# Patient Record
Sex: MALE | URBAN - METROPOLITAN AREA
[De-identification: names, ages, dates, MRNs, and addresses within clinical notes are randomized per-mention and may not be internally consistent; named-entity substitution may affect disease eponyms.]

---

## 2020-09-14 ENCOUNTER — TELEPHONE ENCOUNTER (OUTPATIENT)
Dept: URBAN - METROPOLITAN AREA CLINIC 92 | Facility: CLINIC | Age: 40
End: 2020-09-14

## 2020-09-25 ENCOUNTER — LAB OUTSIDE AN ENCOUNTER (OUTPATIENT)
Dept: URBAN - METROPOLITAN AREA TELEHEALTH 2 | Facility: TELEHEALTH | Age: 40
End: 2020-09-25

## 2020-09-25 ENCOUNTER — OFFICE VISIT (OUTPATIENT)
Dept: URBAN - METROPOLITAN AREA TELEHEALTH 2 | Facility: TELEHEALTH | Age: 40
End: 2020-09-25
Payer: COMMERCIAL

## 2020-09-25 DIAGNOSIS — Z71.89 VACCINE COUNSELING: ICD-10-CM

## 2020-09-25 DIAGNOSIS — Z79.899 HIGH RISK MEDICATION USE: ICD-10-CM

## 2020-09-25 DIAGNOSIS — Z31.69 INFERTILITY COUNSELING: ICD-10-CM

## 2020-09-25 DIAGNOSIS — B18.1 CHRONIC HEPATITIS B: ICD-10-CM

## 2020-09-25 PROCEDURE — G8427 DOCREV CUR MEDS BY ELIG CLIN: HCPCS

## 2020-09-25 PROCEDURE — 1036F TOBACCO NON-USER: CPT

## 2020-09-25 PROCEDURE — G8417 CALC BMI ABV UP PARAM F/U: HCPCS

## 2020-09-25 PROCEDURE — 99203 OFFICE O/P NEW LOW 30 MIN: CPT

## 2020-09-25 PROCEDURE — G9903 PT SCRN TBCO ID AS NON USER: HCPCS

## 2020-09-25 NOTE — HPI-TODAY'S VISIT:
Patient is a 39-year-old male who is been referred by Dr. Te Erickson  for hepatitis b.  Doing fertility evaluation and this is to try in vitro when they do the cycle.  Through the blood work that he has hep b.  Wife says she is negative for hep b and she is immune to hepatitis be and she says that has ab, If she does hep b core and if neg  then was from vaccine. If b core pos had it.  Please see that the patient is from 2020 labs noted to be HIV negative.  Hep B surface antigen positive.  Hep B core IgM negative hep C antibody negative.  RPR negative.  Blood type is B+.  Patient previously was a patient of Dr. Marlon Serna.  Last seen by him back in 2005 relieved by 1 note that we saw.  Patient in  was listed as having chronic hepatitis B and had been on hep B treatment at that time frame about 29 months at a dosage of 10 mg a day.  Due to the fact that the patient had a hep B DNA that was positive at 972,000 they felt that it stopped working so they switched the patient to Baraclude but does use a 0.5 mg a day.  Back in May 2005 the hep B DNA had been 52,200 and Dr. Palencia had plan to start the patient on Baraclude once available due to suspicion of resistance.   We do see an ultrasound done on the patient on 2004 showed a liver span of 15.5 cm with normal-appearing parenchyma.  No gallbladder wall thickening.  No gallstones.  Pancreas was normal normal right kidney of 12.4 x 4.3 x 4.1 cm.  Other laboratories from 2005 showed a glucose of 82 BUN of 12 creatinine 1.0 sodium 143 potassium 4.4 total protein 8.6 albumin 4.7 bilirubin 0.9 AST 20 [id number] up's count 7.2 hemoglobin 15.5 plate count 243 AFP 1.3   he started on baraclude and was on it for a time.  He has not been on treatment since.  Both parents have hep b and never on med. They cleared it.  Father cleared it and mother cleared it.  2020 hpe b 5430.    2020 b sag pos and hcv ab neg. rpr neg and b pos.   They did a genetic screen for glycogen storage disease type 2 (pompe disease).   I don't have liver enzymes.  We look for a dna >2000 and alt  greater than 35 and we don't have that.  He has not hep b care includes even when not on meds to do the u.s every 6m. Not done in while.  They live in [place].   Do u.s and labs and hep b to see where at.  I need to call Dr Erickson re this. In the male pt, sperm works or doesn't. Don't worry about his treatment.   in women the issue much more of an issue to get pregnant and also to decrease the risk to the baby.  In men this has not been the issue that I have that i have seen.  Men with cirrhosis due to hep b or c and if treat them more of a chance to have a child.  Sperm count he says is normal.  Plan: 1. Need to do the labs and the u.s. 2. I will call Dr Erickson later to discuss the case. 3. I think he may have resistance but the new meds do overcome this.  Stressed to pt the need for social distancing and strict handwashing and wearing a mask and to follow any other new or added CDC recommendations as this is an evolving target.  Duration of the visit was 37 min (majority 21 via the [employer]ow and then 16 by phone) with greater than 50% of time spent reviewing treatment and issues. with the pt and his wife.   Addendum: spoke with Dr Erickson. Sent mainly for care of his bep b and not for fertility tx impacted by it and so we advised Dr Erickson of what we were doing and would do labs and u.s and assess for active tx or monitoring. Called pt and spoke with pt re this also.  More than half of the face-to-face time used for counseling and coordination of care.  Patient seen today via telehealth by agreement and consent of patient in light of current COVID-19 pandemic. I used video conferencing during the visit. The patient encounter is appropriate and reasonable under the circumstances given the patient's particular presentation at this time. The patient has been advised of the followin) the potential risks and limitations of this mode of treatment (including but not limited to the absence of in-person examination); 2) the right to refuse telehealth services at any point without affecting the right to future care; 3) the right to receive in-person services, included immediately after this consultation if an urgent need arises; 4) information, including identifiable images or information from this telehealth consult, will only be shared in accordance with HIPAA regulations. Any and all of the patient's and/or patient's family member's questions on this issue have been answered. The patient has verbally consented to be treated via telehealth services. The patient has also been advised to contact this office for worsening conditions or problems, and seek emergency medical treatment and/or call 911 if the patient deems either necessary.

## 2020-10-01 ENCOUNTER — OFFICE VISIT (OUTPATIENT)
Dept: URBAN - METROPOLITAN AREA CLINIC 91 | Facility: CLINIC | Age: 40
End: 2020-10-01
Payer: COMMERCIAL

## 2020-10-01 DIAGNOSIS — B18.1 CHRONIC HEPATITIS B: ICD-10-CM

## 2020-10-01 PROCEDURE — 93975 VASCULAR STUDY: CPT

## 2020-10-01 PROCEDURE — 76705 ECHO EXAM OF ABDOMEN: CPT

## 2020-10-02 ENCOUNTER — TELEPHONE ENCOUNTER (OUTPATIENT)
Dept: URBAN - METROPOLITAN AREA CLINIC 92 | Facility: CLINIC | Age: 40
End: 2020-10-02

## 2020-10-02 NOTE — HPI-TODAY'S VISIT:
Dear Kale, Good news foir you as U.s: overall felt to be a negative right upper quadrant ultrasound. Liver vessels patent. Spleen 10cm.  right kidney 12.4cm and no hydronephrosis. Pancreas normal. No gallstone. Common duct 4.6mm.  Liver normal echogenicity and no lesions.   Needs to be done every 6m.  Dr Gonzalez

## 2020-10-03 ENCOUNTER — TELEPHONE ENCOUNTER (OUTPATIENT)
Dept: URBAN - METROPOLITAN AREA CLINIC 92 | Facility: CLINIC | Age: 40
End: 2020-10-03

## 2020-10-03 LAB
A/G RATIO: 1.8
ALBUMIN: 5
ALKALINE PHOSPHATASE: 58
ALT (SGPT): 31
AST (SGOT): 30
BASO (ABSOLUTE): 0
BASOS: 1
BILIRUBIN, TOTAL: 1
BUN/CREATININE RATIO: 13
BUN: 13
CALCIUM: 9.8
CARBON DIOXIDE, TOTAL: 25
CHLORIDE: 102
CREATININE: 0.97
EGFR IF AFRICN AM: 114
EGFR IF NONAFRICN AM: 99
EOS (ABSOLUTE): 0.1
EOS: 1
GLOBULIN, TOTAL: 2.8
GLUCOSE: 94
HBV IU/ML: 2270
HEMATOCRIT: 44.2
HEMATOLOGY COMMENTS:: (no result)
HEMOGLOBIN: 14.7
HEP A AB, TOTAL: NEGATIVE
HEP BE AB: POSITIVE
HEP BE AG: POSITIVE
IMMATURE CELLS: (no result)
IMMATURE GRANS (ABS): 0
IMMATURE GRANULOCYTES: 0
LOG10 HBV IU/ML: 3.36
LYMPHS (ABSOLUTE): 2.1
LYMPHS: 54
MCH: 31.5
MCHC: 33.3
MCV: 95
MONOCYTES(ABSOLUTE): 0.2
MONOCYTES: 5
NEUTROPHILS (ABSOLUTE): 1.5
NEUTROPHILS: 39
NRBC: (no result)
PLATELETS: 173
POTASSIUM: 4.9
PROTEIN, TOTAL: 7.8
RBC: 4.67
RDW: 12
SODIUM: 142
TEST INFORMATION:: (no result)
WBC: 3.8

## 2020-10-03 NOTE — HPI-TODAY'S VISIT:
Dear Kale Lee The results of your recent tests are explained below: glu 94 and bun 13 and cr 0.97 and na 142 and k 4.9 and cl 102 and ca 9.8 and alb 5.0 and tb 1.0 and alk 58 and ast 30 and alt 31 (ideal alt <35).  wbc 5.8 hg 14.7 plat 173 normal. mcv 95.  Still eag positive. Curiously also eab positive suggesting that you may be transitioning to the lower replication state as usually see one or the other positive and not both unless in that transition. HBV dna 2270 so above 2000 but not with the abnormal ALT needed to say you need treatment by guidelines.  No immunity seen to hepatitis a and consider getting hep a vaccine series as if get that and have the chronic hep b can be sicker than if did not have the chronic hep b.  We will discuss this more at the next visit.   Dr Gonzalez

## 2020-10-07 ENCOUNTER — OFFICE VISIT (OUTPATIENT)
Dept: URBAN - METROPOLITAN AREA TELEHEALTH 2 | Facility: TELEHEALTH | Age: 40
End: 2020-10-07

## 2020-10-23 ENCOUNTER — OFFICE VISIT (OUTPATIENT)
Dept: URBAN - METROPOLITAN AREA TELEHEALTH 2 | Facility: TELEHEALTH | Age: 40
End: 2020-10-23
Payer: COMMERCIAL

## 2020-10-23 DIAGNOSIS — B18.1 CHRONIC HEPATITIS B: ICD-10-CM

## 2020-10-23 DIAGNOSIS — Z31.69 INFERTILITY COUNSELING: ICD-10-CM

## 2020-10-23 DIAGNOSIS — E66.3 OVERWEIGHT: ICD-10-CM

## 2020-10-23 DIAGNOSIS — E74.02 POMPE DISEASE: ICD-10-CM

## 2020-10-23 PROCEDURE — G8417 CALC BMI ABV UP PARAM F/U: HCPCS

## 2020-10-23 PROCEDURE — 1036F TOBACCO NON-USER: CPT

## 2020-10-23 PROCEDURE — G8427 DOCREV CUR MEDS BY ELIG CLIN: HCPCS

## 2020-10-23 PROCEDURE — G9903 PT SCRN TBCO ID AS NON USER: HCPCS

## 2020-10-23 PROCEDURE — 99215 OFFICE O/P EST HI 40 MIN: CPT

## 2020-10-23 PROCEDURE — 99214 OFFICE O/P EST MOD 30 MIN: CPT

## 2020-10-23 PROCEDURE — G8483 FLU IMM NO ADMIN DOC REA: HCPCS

## 2020-10-23 NOTE — HPI-TODAY'S VISIT:
Patient is a 39-year-old male who is been referred by Dr. Te Erickson  for hepatitis b chronic state.  Doing fertility evaluation and this is to try in vitro when they do the cycle.   They say that is to retrieve eggs at end of nov and may want to wait for next dec.   Recent labs:  glu 94 and bun 13 and cr 0.97 and na 142 and k 4.9 and cl 102 and ca 9.8 and alb 5.0 and tb 1.0 and alk 58 and ast 30 and alt 31 (ideal alt <35).  wbc 5.8 hg 14.7 plat 173 normal. mcv 95.  Still eag positive. Curiously also eab positive suggesting that he may be transitioning to the lower replication state as usually see one or the other positive and not both unless in that transition. HBV dna 2270 so above 2000 but not with the abnormal ALT needed to say you need treatment by guidelines.  No immunity seen to hepatitis a and consider getting hep a vaccine series as if get that and have the chronic hep b can be sicker than if did not have the chronic hep b.  we would redo in 6 and 12 weeks.   U.s: overall felt to be a negative right upper quadrant ultrasound. Liver vessels patent. Spleen 10cm.  right kidney 12.4cm and no hydronephrosis. Pancreas normal. No gallstone. Common duct 4.6mm.  Liver normal echogenicity and no lesions.   Stressed should be on healthy diet.  Through the blood work that he has hep b.  Wife says she is negative for hep b and she is immune to hepatitis b and she says that has ab, If she does hep b core and if neg  then was from vaccine. If b core pos had it.  Please see that the patient is from 2020 labs noted to be HIV negative.  Hep B surface antigen positive.  Hep B core IgM negative hep C antibody negative.  RPR negative.  Blood type is B+.  Patient previously was a patient of Dr. Marlon Serna.  Last seen by him back in 2005 relieved by 1 note that we saw.  Patient in  was listed as having chronic hepatitis B and had been on hep B treatment at that time frame about 29 months at a dosage of 10 mg a day.  Due to the fact that the patient had a hep B DNA that was positive at 972,000 they felt that it stopped working so they switched the patient to Baraclude but does use a 0.5 mg a day.  Back in May 2005 the hep B DNA had been 52,200 and Dr. Serna had plan to start the patient on Baraclude once available due to suspicion of resistance.   We do see an ultrasound done on the patient on 2004 showed a liver span of 15.5 cm with normal-appearing parenchyma.  No gallbladder wall thickening.  No gallstones.  Pancreas was normal normal right kidney of 12.4 x 4.3 x 4.1 cm.  Other laboratories from 2005 showed a glucose of 82 BUN of 12 creatinine 1.0 sodium 143 potassium 4.4 total protein 8.6 albumin 4.7 bilirubin 0.9 AST 20 16775 up's count 7.2 hemoglobin 15.5 plate count 243 AFP 1.3   He started on baraclude and was on it for a time.  He has not been on treatment since.  Both parents have hep b and never on med. They cleared it.  Father cleared it and mother cleared it.  2020 hep b 5430.    2020 b sag pos and hcv ab neg. rpr neg and b pos.   They did a genetic screen for glycogen storage disease type 2 (pompe disease).   In general we look for a dna >2000 and alt  greater than 35 and we don't have  the full picture.  He has not hep b care includes even when not on meds to do the u.s every 6m. Not done in while.  They live in [place].   Plan labs in 6 and 12 weeks.  I spoke with Dr Erickson re this.    Noted that in women the issue much more of an issue to get pregnant and also to decrease the risk to the baby.  Men with cirrhosis due to hep b or c and if treat them more of a chance to have a child.  Sperm count he says is normal.  Plan: 1. Need to do the labs in 6 and 12 weeks. 2. If stable then slow down to 3 and 6m. 3. Stay on imaging every 6m. 4.  Will send note to Dr Erickson later to discuss the case.  Stressed to pt the need for social distancing and strict handwashing and wearing a mask and to follow any other new or added CDC recommendations as this is an evolving target.  Duration of the visit was 40 min ( 3: 15 via the [employer] video and then 15 min by [employer] video and  then 22:13 by phone due to internet issues) with greater than 50% of time spent reviewing treatment and issues. with the pt and his wife.   More than half of the face-to-face time used for counseling and coordination of care.  Attempts were made to use real-time two-way video technology for today's encounter. Due to a technological failure or access issues, telephone technology was used in lieu of an office visit due to the current COVID-19 pandemic crisis and need for social isolation. Patient seen today via telehealth by agreement and consent of patient in light of current COVID-19 pandemic. I used video conferencing during the visit. The patient encounter is appropriate and reasonable under the circumstances given the patient's particular presentation at this time. The patient has been advised of the followin) the potential risks and limitations of this mode of treatment (including but not limited to the absence of in-person examination); 2) the right to refuse telehealth services at any point without affecting the right to future care; 3) the right to receive in-person services, included immediately after this consultation if an urgent need arises; 4) information, including identifiable images or information from this telehealth consult, will only be shared in accordance with HIPAA regulations. Any and all of the patient's and/or patient's family member's questions on this issue have been answered. The patient has verbally consented to be treated via telehealth services. The patient has also been advised to contact this office for worsening conditions or problems, and seek emergency medical treatment and/or call 911 if the patient deems either necessary.

## 2020-10-26 ENCOUNTER — WEB ENCOUNTER (OUTPATIENT)
Dept: URBAN - METROPOLITAN AREA CLINIC 86 | Facility: CLINIC | Age: 40
End: 2020-10-26

## 2020-11-04 ENCOUNTER — WEB ENCOUNTER (OUTPATIENT)
Dept: URBAN - METROPOLITAN AREA CLINIC 86 | Facility: CLINIC | Age: 40
End: 2020-11-04

## 2020-11-30 ENCOUNTER — LAB OUTSIDE AN ENCOUNTER (OUTPATIENT)
Dept: URBAN - METROPOLITAN AREA TELEHEALTH 2 | Facility: TELEHEALTH | Age: 40
End: 2020-11-30

## 2021-01-04 ENCOUNTER — WEB ENCOUNTER (OUTPATIENT)
Dept: URBAN - METROPOLITAN AREA CLINIC 86 | Facility: CLINIC | Age: 41
End: 2021-01-04

## 2021-01-05 ENCOUNTER — LAB OUTSIDE AN ENCOUNTER (OUTPATIENT)
Dept: URBAN - METROPOLITAN AREA CLINIC 86 | Facility: CLINIC | Age: 41
End: 2021-01-05

## 2021-01-10 ENCOUNTER — WEB ENCOUNTER (OUTPATIENT)
Dept: URBAN - METROPOLITAN AREA CLINIC 92 | Facility: CLINIC | Age: 41
End: 2021-01-10

## 2021-01-11 ENCOUNTER — LAB OUTSIDE AN ENCOUNTER (OUTPATIENT)
Dept: URBAN - METROPOLITAN AREA TELEHEALTH 2 | Facility: TELEHEALTH | Age: 41
End: 2021-01-11

## 2021-01-27 ENCOUNTER — OFFICE VISIT (OUTPATIENT)
Dept: URBAN - METROPOLITAN AREA TELEHEALTH 2 | Facility: TELEHEALTH | Age: 41
End: 2021-01-27

## 2021-01-27 NOTE — HPI-TODAY'S VISIT:
Patient is a 40-year-old male after prior oct 2020 visit who is been referred by Dr. Te Erickson  for hepatitis b chronic state.  Doing fertility evaluation and this is to try in vitro when they do the cycle.   They say that is to retrieve eggs at end of nov and may want to wait for next dec.   Recent labs:  glu 94 and bun 13 and cr 0.97 and na 142 and k 4.9 and cl 102 and ca 9.8 and alb 5.0 and tb 1.0 and alk 58 and ast 30 and alt 31 (ideal alt <35).  wbc 5.8 hg 14.7 plat 173 normal. mcv 95.  Still eag positive. Curiously also eab positive suggesting that he may be transitioning to the lower replication state as usually see one or the other positive and not both unless in that transition. HBV dna 2270 so above 2000 but not with the abnormal ALT needed to say you need treatment by guidelines.  No immunity seen to hepatitis a and consider getting hep a vaccine series as if get that and have the chronic hep b can be sicker than if did not have the chronic hep b.  we would redo in 6 and 12 weeks.   U.s: overall felt to be a negative right upper quadrant ultrasound. Liver vessels patent. Spleen 10cm.  right kidney 12.4cm and no hydronephrosis. Pancreas normal. No gallstone. Common duct 4.6mm.  Liver normal echogenicity and no lesions.   Stressed should be on healthy diet.  Through the blood work that he has hep b.  Wife says she is negative for hep b and she is immune to hepatitis b and she says that has ab, If she does hep b core and if neg  then was from vaccine. If b core pos had it.  Please see that the patient is from 2020 labs noted to be HIV negative.  Hep B surface antigen positive.  Hep B core IgM negative hep C antibody negative.  RPR negative.  Blood type is B+.  Patient previously was a patient of Dr. Marlon Serna.  Last seen by him back in 2005 relieved by 1 note that we saw.  Patient in  was listed as having chronic hepatitis B and had been on hep B treatment at that time frame about 29 months at a dosage of 10 mg a day.  Due to the fact that the patient had a hep B DNA that was positive at 972,000 they felt that it stopped working so they switched the patient to Baraclude but does use a 0.5 mg a day.  Back in May 2005 the hep B DNA had been 52,200 and Dr. Serna had plan to start the patient on Baraclude once available due to suspicion of resistance.   We do see an ultrasound done on the patient on 2004 showed a liver span of 15.5 cm with normal-appearing parenchyma.  No gallbladder wall thickening.  No gallstones.  Pancreas was normal normal right kidney of 12.4 x 4.3 x 4.1 cm.  Other laboratories from 2005 showed a glucose of 82 BUN of 12 creatinine 1.0 sodium 143 potassium 4.4 total protein 8.6 albumin 4.7 bilirubin 0.9 AST 20 [id number] up's count 7.2 hemoglobin 15.5 plate count 243 AFP 1.3   He started on baraclude and was on it for a time.  He has not been on treatment since.  Both parents have hep b and never on med. They cleared it.  Father cleared it and mother cleared it.  2020 hep b 5430.    2020 b sag pos and hcv ab neg. rpr neg and b pos.   They did a genetic screen for glycogen storage disease type 2 (pompe disease).   In general we look for a dna >2000 and alt  greater than 35 and we don't have  the full picture.  He has not hep b care includes even when not on meds to do the u.s every 6m. Not done in while.  They live in [place].   Plan labs in 6 and 12 weeks.  I spoke with Dr Erickson re this.    Noted that in women the issue much more of an issue to get pregnant and also to decrease the risk to the baby.  Men with cirrhosis due to hep b or c and if treat them more of a chance to have a child.  Sperm count he says is normal.  Plan: 1. Need to do the labs in 6 and 12 weeks. 2. If stable then slow down to 3 and 6m. 3. Stay on imaging every 6m. 4.  Will send note to Dr Erickson later to discuss the case.  Stressed to pt the need for social distancing and strict handwashing and wearing a mask and to follow any other new or added CDC recommendations as this is an evolving target.  Duration of the visit was min ( 3: 15 via the [employer] video and then 15 min by [employer] video and  then 22:13 by phone due to internet issues) with greater than 50% of time spent reviewing treatment and issues. with the pt and his wife.   More than half of the face-to-face time used for counseling and coordination of care.  Attempts were made to use real-time two-way video technology for today's encounter. Due to a technological failure or access issues, telephone technology was used in lieu of an office visit due to the current COVID-19 pandemic crisis and need for social isolation. Patient seen today via telehealth by agreement and consent of patient in light of current COVID-19 pandemic. I used video conferencing during the visit. The patient encounter is appropriate and reasonable under the circumstances given the patient's particular presentation at this time. The patient has been advised of the followin) the potential risks and limitations of this mode of treatment (including but not limited to the absence of in-person examination); 2) the right to refuse telehealth services at any point without affecting the right to future care; 3) the right to receive in-person services, included immediately after this consultation if an urgent need arises; 4) information, including identifiable images or information from this telehealth consult, will only be shared in accordance with HIPAA regulations. Any and all of the patient's and/or patient's family member's questions on this issue have been answered. The patient has verbally consented to be treated via telehealth services. The patient has also been advised to contact this office for worsening conditions or problems, and seek emergency medical treatment and/or call 911 if the patient deems either necessary.

## 2021-01-28 ENCOUNTER — TELEPHONE ENCOUNTER (OUTPATIENT)
Dept: URBAN - METROPOLITAN AREA CLINIC 92 | Facility: CLINIC | Age: 41
End: 2021-01-28

## 2021-01-28 LAB
A/G RATIO: 1.5
ALBUMIN: 4.7
ALKALINE PHOSPHATASE: 64
ALT (SGPT): 31
AST (SGOT): 29
BASO (ABSOLUTE): 0
BASOS: 0
BILIRUBIN, TOTAL: 0.5
BUN/CREATININE RATIO: 9
BUN: 9
CALCIUM: 9.6
CARBON DIOXIDE, TOTAL: 26
CHLORIDE: 102
CREATININE: 0.97
EGFR IF AFRICN AM: 113
EGFR IF NONAFRICN AM: 98
EOS (ABSOLUTE): 0.1
EOS: 2
GLOBULIN, TOTAL: 3.1
GLUCOSE: 97
HBV LOG10: 2.04
HEMATOCRIT: 41.8
HEMATOLOGY COMMENTS:: (no result)
HEMOGLOBIN: 14.1
HEPATITIS B QUANTITATION: 110
IMMATURE CELLS: (no result)
IMMATURE GRANS (ABS): 0
IMMATURE GRANULOCYTES: 0
LYMPHS (ABSOLUTE): 1.5
LYMPHS: 36
MCH: 31.8
MCHC: 33.7
MCV: 94
MONOCYTES(ABSOLUTE): 0.3
MONOCYTES: 7
NEUTROPHILS (ABSOLUTE): 2.2
NEUTROPHILS: 55
NRBC: (no result)
PLATELETS: 178
POTASSIUM: 4.6
PROTEIN, TOTAL: 7.8
RBC: 4.44
RDW: 12
SODIUM: 140
TEST INFORMATION:: (no result)
WBC: 4

## 2021-02-01 ENCOUNTER — TELEPHONE ENCOUNTER (OUTPATIENT)
Dept: URBAN - METROPOLITAN AREA CLINIC 92 | Facility: CLINIC | Age: 41
End: 2021-02-01

## 2021-03-29 PROBLEM — 453861000124107: Status: ACTIVE | Noted: 2020-09-24

## 2021-03-29 PROBLEM — 238131007: Status: ACTIVE | Noted: 2020-09-25

## 2021-03-29 PROBLEM — 61977001: Status: ACTIVE | Noted: 2020-09-24

## 2021-03-29 PROBLEM — 443913008: Status: ACTIVE | Noted: 2020-09-24

## 2021-03-29 PROBLEM — 305058001: Status: ACTIVE | Noted: 2020-09-25

## 2021-03-29 PROBLEM — 274864009: Status: ACTIVE | Noted: 2020-09-25

## 2021-04-05 ENCOUNTER — DASHBOARD ENCOUNTERS (OUTPATIENT)
Age: 41
End: 2021-04-05

## 2021-04-06 ENCOUNTER — OFFICE VISIT (OUTPATIENT)
Dept: URBAN - METROPOLITAN AREA TELEHEALTH 2 | Facility: TELEHEALTH | Age: 41
End: 2021-04-06

## 2021-04-06 NOTE — HPI-OTHER HISTORIES
Dear Kale Lee  The results of your recent tests are explained below: Jan 20 wbc 4 hg 14.1 plat 178. mcv 94. tb 0.5 and alk 64 and ast 29 and alt 31. Prior ast 30 and alt 31. ideal alt less than35.  na 140 and k 4.6 and glu 97 and cr 0.97.  HBV dna 110. So low.

## 2021-04-06 NOTE — HPI-TODAY'S VISIT:
Patient is a 40-year-old male after prior oct 2020 visit who is been referred by Dr. Te Erickson  for hepatitis b chronic state.  Doing fertility evaluation and this is to try in vitro when they do the cycle.   They say that is to retrieve eggs at end of nov and may want to wait for next dec.   Recent labs:  glu 94 and bun 13 and cr 0.97 and na 142 and k 4.9 and cl 102 and ca 9.8 and alb 5.0 and tb 1.0 and alk 58 and ast 30 and alt 31 (ideal alt less than35).  wbc 5.8 hg 14.7 plat 173 normal. mcv 95.  Still eag positive. Curiously also eab positive suggesting that he may be transitioning to the lower replication state as usually see one or the other positive and not both unless in that transition. HBV dna 2270 so above 2000 but not with the abnormal ALT needed to say you need treatment by guidelines.  No immunity seen to hepatitis a and consider getting hep a vaccine series as if get that and have the chronic hep b can be sicker than if did not have the chronic hep b.  we would redo in 6 and 12 weeks.   U.s: overall felt to be a negative right upper quadrant ultrasound. Liver vessels patent. Spleen 10cm.  right kidney 12.4cm and no hydronephrosis. Pancreas normal. No gallstone. Common duct 4.6mm.  Liver normal echogenicity and no lesions.   Stressed should be on healthy diet.  Through the blood work that he has hep b.  Wife says she is negative for hep b and she is immune to hepatitis b and she says that has ab, If she does hep b core and if neg  then was from vaccine. If b core pos had it.  Please see that the patient is from 2020 labs noted to be HIV negative.  Hep B surface antigen positive.  Hep B core IgM negative hep C antibody negative.  RPR negative.  Blood type is B+.  Patient previously was a patient of Dr. Marlon Serna.  Last seen by him back in 2005 relieved by 1 note that we saw.  Patient in  was listed as having chronic hepatitis B and had been on hep B treatment at that time frame about 29 months at a dosage of 10 mg a day.  Due to the fact that the patient had a hep B DNA that was positive at 972,000 they felt that it stopped working so they switched the patient to Baraclude but does use a 0.5 mg a day.  Back in May 2005 the hep B DNA had been 52,200 and Dr. Serna had plan to start the patient on Baraclude once available due to suspicion of resistance.   We do see an ultrasound done on the patient on 2004 showed a liver span of 15.5 cm with normal-appearing parenchyma.  No gallbladder wall thickening.  No gallstones.  Pancreas was normal normal right kidney of 12.4 x 4.3 x 4.1 cm.  Other laboratories from 2005 showed a glucose of 82 BUN of 12 creatinine 1.0 sodium 143 potassium 4.4 total protein 8.6 albumin 4.7 bilirubin 0.9 AST 20 [id number] up's count 7.2 hemoglobin 15.5 plate count 243 AFP 1.3   He started on baraclude and was on it for a time.  He has not been on treatment since.  Both parents have hep b and never on med. They cleared it.  Father cleared it and mother cleared it.  2020 hep b 5430.    2020 b sag pos and hcv ab neg. rpr neg and b pos.   They did a genetic screen for glycogen storage disease type 2 (pompe disease).   In general we look for a dna >2000 and alt  greater than 35 and we don't have  the full picture.  He has not hep b care includes even when not on meds to do the u.s every 6m. Not done in while.  They live in [place].   Plan labs in 6 and 12 weeks.  I spoke with Dr Erickson re this.    Noted that in women the issue much more of an issue to get pregnant and also to decrease the risk to the baby.  Men with cirrhosis due to hep b or c and if treat them more of a chance to have a child.  Sperm count he says is normal.  Plan: 1. Need to do the labs in 6 and 12 weeks. 2. If stable then slow down to 3 and 6m. 3. Stay on imaging every 6m. 4.  Will send note to Dr Erickson later to discuss the case.  Stressed to pt the need for social distancing and strict handwashing and wearing a mask and to follow any other new or added CDC recommendations as this is an evolving target.  Duration of the visit was min ( 3: 15 via the [employer] video and then 15 min by [employer] video and  then 22:13 by phone due to internet issues) with greater than 50% of time spent reviewing treatment and issues. with the pt and his wife.   More than half of the face-to-face time used for counseling and coordination of care.  Attempts were made to use real-time two-way video technology for today's encounter. Due to a technological failure or access issues, telephone technology was used in lieu of an office visit due to the current COVID-19 pandemic crisis and need for social isolation. Patient seen today via telehealth by agreement and consent of patient in light of current COVID-19 pandemic. I used video conferencing during the visit. The patient encounter is appropriate and reasonable under the circumstances given the patient's particular presentation at this time. The patient has been advised of the followin) the potential risks and limitations of this mode of treatment (including but not limited to the absence of in-person examination); 2) the right to refuse telehealth services at any point without affecting the right to future care; 3) the right to receive in-person services, included immediately after this consultation if an urgent need arises; 4) information, including identifiable images or information from this telehealth consult, will only be shared in accordance with HIPAA regulations. Any and all of the patient's and/or patient's family member's questions on this issue have been answered. The patient has verbally consented to be treated via telehealth services. The patient has also been advised to contact this office for worsening conditions or problems, and seek emergency medical treatment and/or call 911 if the patient deems either necessary.

## 2024-04-15 ENCOUNTER — OV NP (OUTPATIENT)
Dept: URBAN - METROPOLITAN AREA CLINIC 23 | Facility: CLINIC | Age: 44
End: 2024-04-15